# Patient Record
Sex: FEMALE | Race: WHITE | NOT HISPANIC OR LATINO | URBAN - METROPOLITAN AREA
[De-identification: names, ages, dates, MRNs, and addresses within clinical notes are randomized per-mention and may not be internally consistent; named-entity substitution may affect disease eponyms.]

---

## 2020-11-13 ENCOUNTER — APPOINTMENT (RX ONLY)
Dept: URBAN - METROPOLITAN AREA CLINIC 123 | Facility: CLINIC | Age: 20
Setting detail: DERMATOLOGY
End: 2020-11-13

## 2020-11-13 VITALS — TEMPERATURE: 97.5 F

## 2020-11-13 DIAGNOSIS — D22 MELANOCYTIC NEVI: ICD-10-CM

## 2020-11-13 DIAGNOSIS — D18.0 HEMANGIOMA: ICD-10-CM

## 2020-11-13 DIAGNOSIS — L81.4 OTHER MELANIN HYPERPIGMENTATION: ICD-10-CM

## 2020-11-13 DIAGNOSIS — L91.8 OTHER HYPERTROPHIC DISORDERS OF THE SKIN: ICD-10-CM

## 2020-11-13 PROBLEM — D48.5 NEOPLASM OF UNCERTAIN BEHAVIOR OF SKIN: Status: ACTIVE | Noted: 2020-11-13

## 2020-11-13 PROBLEM — D22.9 MELANOCYTIC NEVI, UNSPECIFIED: Status: ACTIVE | Noted: 2020-11-13

## 2020-11-13 PROBLEM — D18.01 HEMANGIOMA OF SKIN AND SUBCUTANEOUS TISSUE: Status: ACTIVE | Noted: 2020-11-13

## 2020-11-13 PROCEDURE — ? ADDITIONAL NOTES

## 2020-11-13 PROCEDURE — ? COUNSELING

## 2020-11-13 PROCEDURE — 11102 TANGNTL BX SKIN SINGLE LES: CPT

## 2020-11-13 PROCEDURE — 11103 TANGNTL BX SKIN EA SEP/ADDL: CPT

## 2020-11-13 PROCEDURE — 99203 OFFICE O/P NEW LOW 30 MIN: CPT | Mod: 25

## 2020-11-13 PROCEDURE — ? BIOPSY BY SHAVE METHOD

## 2020-11-13 ASSESSMENT — LOCATION SIMPLE DESCRIPTION DERM
LOCATION SIMPLE: RIGHT THIGH
LOCATION SIMPLE: LEFT SHOULDER
LOCATION SIMPLE: ABDOMEN

## 2020-11-13 ASSESSMENT — LOCATION ZONE DERM
LOCATION ZONE: ARM
LOCATION ZONE: TRUNK
LOCATION ZONE: LEG

## 2020-11-13 ASSESSMENT — LOCATION DETAILED DESCRIPTION DERM
LOCATION DETAILED: RIGHT ANTERIOR MEDIAL PROXIMAL THIGH
LOCATION DETAILED: SUBXIPHOID
LOCATION DETAILED: LEFT POSTERIOR SHOULDER
LOCATION DETAILED: RIGHT RIB CAGE

## 2023-09-21 PROBLEM — Z00.00 ENCOUNTER FOR PREVENTIVE HEALTH EXAMINATION: Status: ACTIVE | Noted: 2023-09-21

## 2023-09-27 ENCOUNTER — APPOINTMENT (OUTPATIENT)
Dept: ANTEPARTUM | Facility: CLINIC | Age: 23
End: 2023-09-27
Payer: COMMERCIAL

## 2023-09-27 ENCOUNTER — ASOB RESULT (OUTPATIENT)
Age: 23
End: 2023-09-27

## 2023-09-27 PROCEDURE — 76813 OB US NUCHAL MEAS 1 GEST: CPT

## 2023-09-27 PROCEDURE — 76801 OB US < 14 WKS SINGLE FETUS: CPT | Mod: 59

## 2023-11-27 ENCOUNTER — APPOINTMENT (OUTPATIENT)
Dept: ANTEPARTUM | Facility: CLINIC | Age: 23
End: 2023-11-27
Payer: COMMERCIAL

## 2023-11-27 ENCOUNTER — ASOB RESULT (OUTPATIENT)
Age: 23
End: 2023-11-27

## 2023-11-27 PROCEDURE — 76811 OB US DETAILED SNGL FETUS: CPT

## 2024-02-20 ENCOUNTER — OUTPATIENT (OUTPATIENT)
Dept: INPATIENT UNIT | Facility: HOSPITAL | Age: 24
LOS: 1 days | Discharge: HOME | End: 2024-02-20
Payer: COMMERCIAL

## 2024-02-20 ENCOUNTER — APPOINTMENT (OUTPATIENT)
Dept: ANTEPARTUM | Facility: CLINIC | Age: 24
End: 2024-02-20
Payer: COMMERCIAL

## 2024-02-20 ENCOUNTER — ASOB RESULT (OUTPATIENT)
Age: 24
End: 2024-02-20

## 2024-02-20 ENCOUNTER — TRANSCRIPTION ENCOUNTER (OUTPATIENT)
Age: 24
End: 2024-02-20

## 2024-02-20 VITALS
DIASTOLIC BLOOD PRESSURE: 71 MMHG | RESPIRATION RATE: 16 BRPM | SYSTOLIC BLOOD PRESSURE: 120 MMHG | TEMPERATURE: 98 F | HEART RATE: 95 BPM

## 2024-02-20 VITALS — HEART RATE: 89 BPM | SYSTOLIC BLOOD PRESSURE: 103 MMHG | DIASTOLIC BLOOD PRESSURE: 56 MMHG

## 2024-02-20 DIAGNOSIS — Z90.89 ACQUIRED ABSENCE OF OTHER ORGANS: Chronic | ICD-10-CM

## 2024-02-20 DIAGNOSIS — O26.899 OTHER SPECIFIED PREGNANCY RELATED CONDITIONS, UNSPECIFIED TRIMESTER: ICD-10-CM

## 2024-02-20 PROCEDURE — 59025 FETAL NON-STRESS TEST: CPT | Mod: 26

## 2024-02-20 PROCEDURE — 76819 FETAL BIOPHYS PROFIL W/O NST: CPT | Mod: 26

## 2024-02-20 PROCEDURE — 99221 1ST HOSP IP/OBS SF/LOW 40: CPT | Mod: 25

## 2024-02-20 NOTE — OB PROVIDER TRIAGE NOTE - NSHPPHYSICALEXAM_GEN_ALL_CORE
abdomen: soft, nt on palp  NST in progress   T(C): 36.9 (02-20-24 @ 11:32), Max: 36.9 (02-20-24 @ 11:19)  HR: 93 (02-20-24 @ 11:35) (93 - 95)  BP: 112/63 (02-20-24 @ 11:35) (112/63 - 120/71)  RR: 16 (02-20-24 @ 11:19) (16 - 16)  SpO2: -- abdomen: soft, nt on palp  NST in progress   T(C): 36.9 (02-20-24 @ 11:32), Max: 36.9 (02-20-24 @ 11:19)  HR: 93 (02-20-24 @ 11:35) (93 - 95)  BP: 112/63 (02-20-24 @ 11:35) (112/63 - 120/71)  RR: 16 (02-20-24 @ 11:19) (16 - 16)  SpO2: --  addendum @ 1304:  NST reactive  FH baseline 140 FH variability mod +FH accels no FH decels   toco: irregular   sono reports in ASOB  sono images in ASOB   TAS: BPP: 8/8 vtx anterior placenta SUMAYA: 15.93   pt reports +FM  pt visualized +FM

## 2024-02-20 NOTE — OB PROVIDER TRIAGE NOTE - NSOBPROVIDERNOTE_OBGYN_ALL_OB_FT
24 y/o  @ 33 wks gestation presents with decrease FM   maternal and fetal status reassuring   plan of care d/w dr Florian   pt reports +FM   pt visualized +FM   pt to be discharged home   discharge home   PTL precautions d/w pt  increase fluid intake  instructed on fetal kickcounts   follow up with [facility] 3/14/2024  w/v discharge instructions given  discharged home     discharged @ 0544

## 2024-02-20 NOTE — OB PROVIDER TRIAGE NOTE - HISTORY OF PRESENT ILLNESS
24 y/o  @ 33 wks gestation presents with decrease FM x's 1 hour in [facility] and pt now reports +FM denies any uc's vb or lof denies any n/v/d denies any fever or chills ap care uncomplicated thus far

## 2024-02-20 NOTE — OB PROVIDER TRIAGE NOTE - ADDITIONAL INSTRUCTIONS
22 y/o  @ 33 wks gestation presents with decrease FM   maternal and fetal status reassuring   plan of care d/w dr Florian   pt reports +FM   pt visualized +FM   pt to be discharged home   discharge home   PTL precautions d/w pt  increase fluid intake  instructed on fetal kickcounts   follow up with [facility] 3/14/2024  w/v discharge instructions given  discharged home     discharged @ 8430

## 2024-02-22 DIAGNOSIS — O36.8130 DECREASED FETAL MOVEMENTS, THIRD TRIMESTER, NOT APPLICABLE OR UNSPECIFIED: ICD-10-CM

## 2024-02-22 DIAGNOSIS — Z3A.33 33 WEEKS GESTATION OF PREGNANCY: ICD-10-CM

## 2024-04-01 ENCOUNTER — OUTPATIENT (OUTPATIENT)
Dept: INPATIENT UNIT | Facility: HOSPITAL | Age: 24
LOS: 1 days | Discharge: HOME | End: 2024-04-01
Payer: COMMERCIAL

## 2024-04-01 ENCOUNTER — APPOINTMENT (OUTPATIENT)
Dept: ANTEPARTUM | Facility: CLINIC | Age: 24
End: 2024-04-01

## 2024-04-01 ENCOUNTER — TRANSCRIPTION ENCOUNTER (OUTPATIENT)
Age: 24
End: 2024-04-01

## 2024-04-01 ENCOUNTER — INPATIENT (INPATIENT)
Facility: HOSPITAL | Age: 24
LOS: 1 days | Discharge: HOME | End: 2024-04-03
Attending: OBSTETRICS & GYNECOLOGY | Admitting: OBSTETRICS & GYNECOLOGY
Payer: COMMERCIAL

## 2024-04-01 VITALS
SYSTOLIC BLOOD PRESSURE: 125 MMHG | RESPIRATION RATE: 16 BRPM | DIASTOLIC BLOOD PRESSURE: 65 MMHG | TEMPERATURE: 98 F | HEART RATE: 104 BPM

## 2024-04-01 VITALS — TEMPERATURE: 98 F

## 2024-04-01 VITALS — SYSTOLIC BLOOD PRESSURE: 114 MMHG | DIASTOLIC BLOOD PRESSURE: 68 MMHG | HEART RATE: 97 BPM

## 2024-04-01 DIAGNOSIS — Z90.89 ACQUIRED ABSENCE OF OTHER ORGANS: Chronic | ICD-10-CM

## 2024-04-01 DIAGNOSIS — O26.899 OTHER SPECIFIED PREGNANCY RELATED CONDITIONS, UNSPECIFIED TRIMESTER: ICD-10-CM

## 2024-04-01 LAB
BASOPHILS # BLD AUTO: 0.05 K/UL — SIGNIFICANT CHANGE UP (ref 0–0.2)
BASOPHILS NFR BLD AUTO: 0.4 % — SIGNIFICANT CHANGE UP (ref 0–2)
BLD GP AB SCN SERPL QL: NEGATIVE — SIGNIFICANT CHANGE UP
EOSINOPHIL # BLD AUTO: 0.03 K/UL — SIGNIFICANT CHANGE UP (ref 0–0.5)
EOSINOPHIL NFR BLD AUTO: 0.2 % — SIGNIFICANT CHANGE UP (ref 0–6)
HCT VFR BLD CALC: 38.1 % — SIGNIFICANT CHANGE UP (ref 34.5–45)
HGB BLD-MCNC: 12.8 G/DL — SIGNIFICANT CHANGE UP (ref 11.5–15.5)
IANC: 10.96 K/UL — HIGH (ref 1.8–7.4)
IMM GRANULOCYTES NFR BLD AUTO: 1.4 % — HIGH (ref 0–0.9)
LYMPHOCYTES # BLD AUTO: 1.83 K/UL — SIGNIFICANT CHANGE UP (ref 1–3.3)
LYMPHOCYTES # BLD AUTO: 13.2 % — SIGNIFICANT CHANGE UP (ref 13–44)
MCHC RBC-ENTMCNC: 29.2 PG — SIGNIFICANT CHANGE UP (ref 27–34)
MCHC RBC-ENTMCNC: 33.6 GM/DL — SIGNIFICANT CHANGE UP (ref 32–36)
MCV RBC AUTO: 86.8 FL — SIGNIFICANT CHANGE UP (ref 80–100)
MONOCYTES # BLD AUTO: 0.76 K/UL — SIGNIFICANT CHANGE UP (ref 0–0.9)
MONOCYTES NFR BLD AUTO: 5.5 % — SIGNIFICANT CHANGE UP (ref 2–14)
NEUTROPHILS # BLD AUTO: 10.96 K/UL — HIGH (ref 1.8–7.4)
NEUTROPHILS NFR BLD AUTO: 79.3 % — HIGH (ref 43–77)
NRBC # BLD: 0 /100 WBCS — SIGNIFICANT CHANGE UP (ref 0–0)
NRBC # FLD: 0 K/UL — SIGNIFICANT CHANGE UP (ref 0–0)
PLATELET # BLD AUTO: 212 K/UL — SIGNIFICANT CHANGE UP (ref 150–400)
RBC # BLD: 4.39 M/UL — SIGNIFICANT CHANGE UP (ref 3.8–5.2)
RBC # FLD: 13.2 % — SIGNIFICANT CHANGE UP (ref 10.3–14.5)
RH IG SCN BLD-IMP: POSITIVE — SIGNIFICANT CHANGE UP
RH IG SCN BLD-IMP: POSITIVE — SIGNIFICANT CHANGE UP
WBC # BLD: 13.83 K/UL — HIGH (ref 3.8–10.5)
WBC # FLD AUTO: 13.83 K/UL — HIGH (ref 3.8–10.5)

## 2024-04-01 PROCEDURE — 99212 OFFICE O/P EST SF 10 MIN: CPT

## 2024-04-01 RX ORDER — PRAMOXINE HYDROCHLORIDE 150 MG/15G
1 AEROSOL, FOAM RECTAL EVERY 4 HOURS
Refills: 0 | Status: DISCONTINUED | OUTPATIENT
Start: 2024-04-01 | End: 2024-04-03

## 2024-04-01 RX ORDER — SODIUM CHLORIDE 9 MG/ML
1000 INJECTION, SOLUTION INTRAVENOUS
Refills: 0 | Status: DISCONTINUED | OUTPATIENT
Start: 2024-04-01 | End: 2024-04-02

## 2024-04-01 RX ORDER — DIBUCAINE 1 %
1 OINTMENT (GRAM) RECTAL EVERY 6 HOURS
Refills: 0 | Status: DISCONTINUED | OUTPATIENT
Start: 2024-04-01 | End: 2024-04-03

## 2024-04-01 RX ORDER — SODIUM CHLORIDE 9 MG/ML
3 INJECTION INTRAMUSCULAR; INTRAVENOUS; SUBCUTANEOUS EVERY 8 HOURS
Refills: 0 | Status: DISCONTINUED | OUTPATIENT
Start: 2024-04-01 | End: 2024-04-03

## 2024-04-01 RX ORDER — IBUPROFEN 200 MG
600 TABLET ORAL EVERY 6 HOURS
Refills: 0 | Status: COMPLETED | OUTPATIENT
Start: 2024-04-01 | End: 2025-02-28

## 2024-04-01 RX ORDER — AER TRAVELER 0.5 G/1
1 SOLUTION RECTAL; TOPICAL EVERY 4 HOURS
Refills: 0 | Status: DISCONTINUED | OUTPATIENT
Start: 2024-04-01 | End: 2024-04-03

## 2024-04-01 RX ORDER — CITRIC ACID/SODIUM CITRATE 300-500 MG
15 SOLUTION, ORAL ORAL EVERY 6 HOURS
Refills: 0 | Status: DISCONTINUED | OUTPATIENT
Start: 2024-04-01 | End: 2024-04-02

## 2024-04-01 RX ORDER — OXYTOCIN 10 UNIT/ML
333.33 VIAL (ML) INJECTION
Qty: 20 | Refills: 0 | Status: DISCONTINUED | OUTPATIENT
Start: 2024-04-01 | End: 2024-04-02

## 2024-04-01 RX ORDER — BENZOCAINE 10 %
1 GEL (GRAM) MUCOUS MEMBRANE EVERY 6 HOURS
Refills: 0 | Status: DISCONTINUED | OUTPATIENT
Start: 2024-04-01 | End: 2024-04-03

## 2024-04-01 RX ORDER — HYDROCORTISONE 1 %
1 OINTMENT (GRAM) TOPICAL EVERY 6 HOURS
Refills: 0 | Status: DISCONTINUED | OUTPATIENT
Start: 2024-04-01 | End: 2024-04-03

## 2024-04-01 RX ORDER — CHLORHEXIDINE GLUCONATE 213 G/1000ML
1 SOLUTION TOPICAL DAILY
Refills: 0 | Status: DISCONTINUED | OUTPATIENT
Start: 2024-04-01 | End: 2024-04-02

## 2024-04-01 RX ORDER — CHOLECALCIFEROL (VITAMIN D3) 125 MCG
1 CAPSULE ORAL
Refills: 0 | DISCHARGE

## 2024-04-01 RX ORDER — ACETAMINOPHEN 500 MG
975 TABLET ORAL
Refills: 0 | Status: DISCONTINUED | OUTPATIENT
Start: 2024-04-01 | End: 2024-04-03

## 2024-04-01 RX ORDER — MAGNESIUM HYDROXIDE 400 MG/1
30 TABLET, CHEWABLE ORAL
Refills: 0 | Status: DISCONTINUED | OUTPATIENT
Start: 2024-04-01 | End: 2024-04-03

## 2024-04-01 RX ORDER — SIMETHICONE 80 MG/1
80 TABLET, CHEWABLE ORAL EVERY 4 HOURS
Refills: 0 | Status: DISCONTINUED | OUTPATIENT
Start: 2024-04-01 | End: 2024-04-03

## 2024-04-01 RX ORDER — OXYCODONE HYDROCHLORIDE 5 MG/1
5 TABLET ORAL
Refills: 0 | Status: DISCONTINUED | OUTPATIENT
Start: 2024-04-01 | End: 2024-04-03

## 2024-04-01 RX ORDER — DIPHENHYDRAMINE HCL 50 MG
25 CAPSULE ORAL EVERY 6 HOURS
Refills: 0 | Status: DISCONTINUED | OUTPATIENT
Start: 2024-04-01 | End: 2024-04-03

## 2024-04-01 RX ORDER — TETANUS TOXOID, REDUCED DIPHTHERIA TOXOID AND ACELLULAR PERTUSSIS VACCINE, ADSORBED 5; 2.5; 8; 8; 2.5 [IU]/.5ML; [IU]/.5ML; UG/.5ML; UG/.5ML; UG/.5ML
0.5 SUSPENSION INTRAMUSCULAR ONCE
Refills: 0 | Status: DISCONTINUED | OUTPATIENT
Start: 2024-04-01 | End: 2024-04-03

## 2024-04-01 RX ORDER — KETOROLAC TROMETHAMINE 30 MG/ML
30 SYRINGE (ML) INJECTION ONCE
Refills: 0 | Status: DISCONTINUED | OUTPATIENT
Start: 2024-04-01 | End: 2024-04-02

## 2024-04-01 RX ORDER — OXYTOCIN 10 UNIT/ML
41.67 VIAL (ML) INJECTION
Qty: 20 | Refills: 0 | Status: DISCONTINUED | OUTPATIENT
Start: 2024-04-01 | End: 2024-04-02

## 2024-04-01 RX ORDER — SODIUM CHLORIDE 9 MG/ML
1000 INJECTION, SOLUTION INTRAVENOUS ONCE
Refills: 0 | Status: COMPLETED | OUTPATIENT
Start: 2024-04-01 | End: 2024-04-01

## 2024-04-01 RX ORDER — LANOLIN
1 OINTMENT (GRAM) TOPICAL EVERY 6 HOURS
Refills: 0 | Status: DISCONTINUED | OUTPATIENT
Start: 2024-04-01 | End: 2024-04-03

## 2024-04-01 RX ORDER — OXYCODONE HYDROCHLORIDE 5 MG/1
5 TABLET ORAL ONCE
Refills: 0 | Status: DISCONTINUED | OUTPATIENT
Start: 2024-04-01 | End: 2024-04-03

## 2024-04-01 RX ADMIN — CHLORHEXIDINE GLUCONATE 1 APPLICATION(S): 213 SOLUTION TOPICAL at 18:45

## 2024-04-01 RX ADMIN — SODIUM CHLORIDE 125 MILLILITER(S): 9 INJECTION, SOLUTION INTRAVENOUS at 18:25

## 2024-04-01 RX ADMIN — SODIUM CHLORIDE 1000 MILLILITER(S): 9 INJECTION, SOLUTION INTRAVENOUS at 06:34

## 2024-04-01 NOTE — DISCHARGE NOTE OB - ADDITIONAL INSTRUCTIONS
Call the office to schedule a post-partum appointment in 6 weeks. Follow up @ [facility] office in 7days for PP BP Check  Monitor BP @ home 3 times daily (morning/noon/night)  keep a strict blood pressure log and bring to the office 5-7 days after hospital discharge for review  if you have BP > 160/100 call the office for further advise  if you have headaches, vision changes, upper abdominal pain call the office to notify and go to [facility] Triage for evaluation

## 2024-04-01 NOTE — OB PROVIDER TRIAGE NOTE - NSOBPROVIDERNOTE_OBGYN_ALL_OB_FT
Ms. EVANGELIST OBREGON is a 23y  @ 38w6d here in latent labor and category 2 tracing.   ** Provider called into quicklook, RN noted patient w decel on NST in 90s, HR sounding fetal, maternal HR 110s on pulse ox, Dr. Barnes notified, immediately brought to DT1 with baseline 125, mod variability, + accels.     Plan  - Admit to Labor and Delivery for induction of labor for category 2 tracing.   - Continuous EFM  - NPO   - Consent signed  - Standard labs (T&S, CBC, RPR)  - Epidural PRN, cleared by    - Induction/augmentation agent:   - Consider re-examination in 4 hours     Informed consent was obtained. The following was discussed:  - Induction/augmentation of labor: use of medication and/or cook balloon to begin or enhance labor  - Obstetrical management including internal fetal/contraction monitoring  - Normal vaginal delivery  - Possible  section    Risks, benefits, alternatives, and possible complications have been discussed in detail with the patient in English, patient's preferred language, demonstrating understanding, all questions answered.. Pre-admission, admission, and post admission procedures and expectations were discussed in detail. All questions answered, all appropriate hospital consents were signed. Anticipate normal vaginal delivery.    Evaluation and plan d/w  Ms. EVANGELIST OBREGON is a 23y  @ 38w6d here in latent labor and category 2 tracing.   ** Provider called into quicklook, RN noted patient w decel on NST in 90s, HR sounding fetal, maternal HR 110s on pulse ox, Dr. Barnes notified, immediately brought to DT1 with baseline 125, mod variability, + accels.     Plan  - Admit to Labor and Delivery for induction of labor for category 2 tracing.   - Continuous EFM  - NPO   - Consent signed  - Standard labs (T&S, CBC, RPR)  - Epidural PRN, cleared by Dr. Lira  - Induction/augmentation agent: ?Pitocin, pending more tracing, d/w Dr. Lira   - Consider re-examination in 4 hours     Informed consent was obtained. The following was discussed:  - Induction/augmentation of labor: use of medication and/or cook balloon to begin or enhance labor  - Obstetrical management including internal fetal/contraction monitoring  - Normal vaginal delivery  - Possible  section    Risks, benefits, alternatives, and possible complications have been discussed in detail with the patient in English, patient's preferred language, demonstrating understanding, all questions answered.. Pre-admission, admission, and post admission procedures and expectations were discussed in detail. All questions answered, all appropriate hospital consents were signed. Anticipate normal vaginal delivery.    Evaluation and plan d/w Dr. Lira

## 2024-04-01 NOTE — OB PROVIDER TRIAGE NOTE - HISTORY OF PRESENT ILLNESS
22 y/o , EDC /, GA 38.6 weeks, presents for contractions since 00:15, every 5 minutes, 5/10 in intensity on numeric pain scale. Denies VB/ LOF. Reports good FM.    PNC: [facility]    AP Course: Uncomplicated per pt

## 2024-04-01 NOTE — OB PROVIDER LABOR PROGRESS NOTE - NS_SUBJECTIVE/OBJECTIVE_OBGYN_ALL_OB_FT
Patient seen and examined to assess for cervical change. Effective epidural in situ. Patient comfortable without complaints.  VS  T(C): 36.9 (04-01-24 @ 18:33)  HR: 105 (04-01-24 @ 20:47)  BP: 141/63 (04-01-24 @ 20:41)  RR: 17 (04-01-24 @ 18:33)  SpO2: 100% (04-01-24 @ 20:47)

## 2024-04-01 NOTE — OB RN PATIENT PROFILE - NS TRANSFER PATIENT BELONGINGS
Cell Phone/PDA (specify)/Clothing engagement ring and wedding ring given to patient's spouse in specimen bag./Cell Phone/PDA (specify)/Jewelry/Money (specify)/Clothing

## 2024-04-01 NOTE — OB PROVIDER H&P - ASSESSMENT
Ms. EVANGELIST OBREGON is a 23y  @ 38w6d here in latent labor and category 2 tracing. Last PO   ** Provider called into quicklook, RN noted patient w decel on NST in 90s, HR sounding fetal, maternal HR 110s on pulse ox, Dr. Barnes notified, immediately brought to DT1 with baseline 125, mod variability, + accels.     Plan  - Admit to Labor and Delivery for induction of labor for category 2 tracing.   - Continuous EFM  - NPO   - Consent signed  - Standard labs (T&S, CBC, RPR)  - Epidural PRN, cleared by Dr. Lira  - Induction/augmentation agent: ?Pitocin, pending more tracing, d/w Dr. Lira   - Consider re-examination in 4 hours     Informed consent was obtained. The following was discussed:  - Induction/augmentation of labor: use of medication and/or cook balloon to begin or enhance labor  - Obstetrical management including internal fetal/contraction monitoring  - Normal vaginal delivery  - Possible  section    Risks, benefits, alternatives, and possible complications have been discussed in detail with the patient in English, patient's preferred language, demonstrating understanding, all questions answered.. Pre-admission, admission, and post admission procedures and expectations were discussed in detail. All questions answered, all appropriate hospital consents were signed. Anticipate normal vaginal delivery.    Evaluation and plan d/w Dr. Lira

## 2024-04-01 NOTE — OB PROVIDER TRIAGE NOTE - ADDITIONAL INSTRUCTIONS
- Please follow up with [facility] at your next scheduled appointment tomorrow morning.   - Please return for decreased/no fetal movement, vaginal bleeding similar to that of a period, leaking/gush of fluid, regular contractions occurring 4-5 minutes for one hour or requiring pain medication

## 2024-04-01 NOTE — OB PROVIDER TRIAGE NOTE - NSHPPHYSICALEXAM_GEN_ALL_CORE
Vital Signs Last 24 Hrs  T(C): 36.7 (01 Apr 2024 05:31), Max: 36.7 (01 Apr 2024 05:26)  T(F): 98.06 (01 Apr 2024 05:31), Max: 98.1 (01 Apr 2024 05:26)  HR: 97 (01 Apr 2024 05:33) (97 - 104)  BP: 114/68 (01 Apr 2024 05:33) (114/68 - 125/65)  BP(mean): --  RR: 16 (01 Apr 2024 05:31) (16 - 16)  SpO2: --    Gen: A/O x3  Abd: Gravid uterus, toco in place   TAS: vertex, anterior placenta,  bpm in m-mode, SUMAYA 20.34, BPP 8/8, images saved in ASOB  FHR: in progress  CTX: regular, q1-3 min   SVE: 2.5/50/-3  EFW: 3400

## 2024-04-01 NOTE — OB PROVIDER TRIAGE NOTE - NSOBPROVIDERNOTE_OBGYN_ALL_OB_FT
22 y/o , EDC , GA 38.6 weeks, evidence of latent labor.  -BPP 8/8  -frequent contractions on toco, tracing initially minimal variability  -1L LR bolus  -repeat VE @ 8 AM  -report given to day shift    -d/w Dr. Santa Scott, PAC 22 y/o , EDC , GA 38.6 weeks, evidence of latent labor.  -BPP 8/8  -frequent contractions on toco, tracing initially minimal variability  -1L LR bolus  -repeat VE @ 8 AM  -report given to day shift    -d/w Dr. Santa Scott, PAC    Received sign out from night team.    22 y/o , EDC , GA 38.6 weeks, evidence of latent labor.    - Pt notes spacing out of ctx to q5-10min w slightly worsening of intensity 6/10, continues to decline epidural   - SVE unchanged 2-3 / 50 / -3   - Pt not feeling all of ctx noted on toco. Provider in room as pt w ctx on toco, patient denies feeling and appears comfortable   - Pt counseled, would like to continue to labor @ home @ this time   - End of tracing, pt lying down for VE     Plan  - Patient to be discharged home with follow up and return precautions  - VSS, patient appears comfortable, ambulating without issues, stable for discharge.   - Please follow up with [facility] at your next scheduled appointment tomorrow morning.   - Please return for decreased/no fetal movement, vaginal bleeding similar to that of a period, leaking/gush of fluid, regular contractions occurring 4-5 minutes for one hour or requiring pain medication   - Patient and partner educated of results plan and demonstrate understanding. All questions answered. Discharge instructions provided and signed.   - Discharged at:     Evaluation and plan d/w 22 y/o , EDC , GA 38.6 weeks, evidence of latent labor.  -BPP 8/8  -frequent contractions on toco, tracing initially minimal variability  -1L LR bolus  -repeat VE @ 8 AM  -report given to day shift    -d/w Dr. Santa Scott, PAC    Received sign out from night team.    22 y/o , EDC 4/, GA 38.6 weeks, evidence of latent labor.    - Pt notes spacing out of ctx to q5-10min w slightly worsening of intensity 6/10, continues to decline epidural   - SVE unchanged 2-3 / 50 / -3   - Pt not feeling all of ctx noted on toco. Provider in room as pt w ctx on toco, patient denies feeling and appears comfortable   - Pt counseled, would like to continue to labor @ home @ this time   - End of tracing, pt lying down for VE     Plan  - Patient to be discharged home with follow up and return precautions  - VSS, patient appears comfortable, ambulating without issues, stable for discharge.   - Please follow up with [facility] at your next scheduled appointment tomorrow morning.   - Please return for decreased/no fetal movement, vaginal bleeding similar to that of a period, leaking/gush of fluid, regular contractions occurring 4-5 minutes for one hour or requiring pain medication   - Patient and partner educated of results plan and demonstrate understanding. All questions answered. Discharge instructions provided and signed.   - Discharged at: 08:50    Evaluation and plan d/w Dr. Lira

## 2024-04-01 NOTE — OB PROVIDER H&P - NSHPPHYSICALEXAM_GEN_ALL_CORE
T(C): 36.9 (04-01-24 @ 17:15), Max: 36.9 (04-01-24 @ 17:14)  HR: 94 (04-01-24 @ 17:36) (94 - 118)  BP: 119/75 (04-01-24 @ 17:36) (114/68 - 137/71)  RR: 18 (04-01-24 @ 17:15) (16 - 18)  SpO2: 98% (04-01-24 @ 17:35) (97% - 100%)  General: Female visibly uncomfortable w ctx. Breathing through ctx.   Head: Normocephalic. Atraumatic.   Eyes: No discharge, lids normal, conjunctiva normal  Lungs: No resp distress  Abdomen: Soft, nontender. Gravid.   TAUS:  Sono saved in ASOB.   NST: Reactive. Category 1 currently. Was in EMTALA with prolonged deceleration.   SVE: White physiologic discharge present.   Neuro: No facial asymmetry, no slurred speech, moves all 4 extremities  Mood: Alert and lucid, appropriate mood and affect

## 2024-04-01 NOTE — OB NEONATOLOGY/PEDIATRICIAN DELIVERY SUMMARY - NSPEDSNEONOTESA_OBGYN_ALL_OB_FT
Peds called to LDR for cat II tracings for a 38.6 wk AGA female born via  to a 22 y/o  mother.  No significant maternal or prenatal history. Maternal labs include Blood Type AB+, HIV - , RPR NR , Rubella I , Hep B - , GBS - 3/21. SROM at  on  with clear fluids (ROM hours: 3H).  Baby emerged vigorous, crying, was w/d/s/s with APGARS of 9/9 . Mom plans to initiate breastfeeding , consents Hep B vaccine.  Highest maternal temp: 37.0. EOS 0.09.    : 23  TOB: 2336  Weight: 2910

## 2024-04-01 NOTE — OB RN DELIVERY SUMMARY - BABY A: APGAR 1 MIN REFLEX IRRITABILITY, DELIVERY
From: Dunia Acosta  To: Saul Amaya  Sent: 3/22/2021 2:20 PM CDT  Subject: Non-Urgent Medical Question    Hi,  I'm going to make an appointment with Dr. Amaya this pain to my right buttocks doesn't seem to budge. I'm not sure if I'll be able to get in within 2 weeks, could I get my prescription refilled if possible? I'm going to call to schedule now.  Thanks   (2) cough or sneeze

## 2024-04-01 NOTE — DISCHARGE NOTE OB - PATIENT PORTAL LINK FT
You can access the FollowMyHealth Patient Portal offered by [facility] by registering at the following website: [email or web address]. By joining [employer]’s FollowMyHealth portal, you will also be able to view your health information using other applications (apps) compatible with our system.

## 2024-04-01 NOTE — DISCHARGE NOTE OB - CARE PROVIDERS DIRECT ADDRESSES
[email or web address]

## 2024-04-01 NOTE — OB PROVIDER TRIAGE NOTE - NSHPPHYSICALEXAM_GEN_ALL_CORE
T(C): 36.9 (04-01-24 @ 17:15), Max: 36.9 (04-01-24 @ 17:14)  HR: 94 (04-01-24 @ 17:36) (94 - 118)  BP: 119/75 (04-01-24 @ 17:36) (114/68 - 137/71)  RR: 18 (04-01-24 @ 17:15) (16 - 18)  SpO2: 98% (04-01-24 @ 17:35) (97% - 100%)  General: Female visibly uncomfortable w ctx. Breathing through ctx.   Head: Normocephalic. Atraumatic.   Eyes: No discharge, lids normal, conjunctiva normal  Lungs: No resp distress  Abdomen: Soft, nontender. Gravid.   TAUS:  Sono saved in ASOB.   NST:   SSE: No erythema, edema, lesions to external genitalia. Physiologic discharge present. No bleeding.  Negative pooling.   Neuro: No facial asymmetry, no slurred speech, moves all 4 extremities  Mood: Alert and lucid, appropriate mood and affect T(C): 36.9 (04-01-24 @ 17:15), Max: 36.9 (04-01-24 @ 17:14)  HR: 94 (04-01-24 @ 17:36) (94 - 118)  BP: 119/75 (04-01-24 @ 17:36) (114/68 - 137/71)  RR: 18 (04-01-24 @ 17:15) (16 - 18)  SpO2: 98% (04-01-24 @ 17:35) (97% - 100%)  General: Female visibly uncomfortable w ctx. Breathing through ctx.   Head: Normocephalic. Atraumatic.   Eyes: No discharge, lids normal, conjunctiva normal  Lungs: No resp distress  Abdomen: Soft, nontender. Gravid.   TAUS:  Sono saved in ASOB.   NST: Reactive. Category 1 currently. Was in EMTALA with prolonged deceleration.   SVE: White physiologic discharge present.   Neuro: No facial asymmetry, no slurred speech, moves all 4 extremities  Mood: Alert and lucid, appropriate mood and affect

## 2024-04-01 NOTE — OB RN DELIVERY SUMMARY - NS_SEPSISRSKCALC_OBGYN_ALL_OB_FT
EOS calculated successfully. EOS Risk Factor: 0.5/1000 live births ([facility] national incidence); GA=38w6d; Temp=98.78; ROM=2.85; GBS='Negative'; Antibiotics='No antibiotics or any antibiotics < 2 hrs prior to birth'

## 2024-04-01 NOTE — OB PROVIDER TRIAGE NOTE - HISTORY OF PRESENT ILLNESS
Ms. EVANGELIST OBREGON is a 23y  @ 38w6d p/w ctx q5min incr in pain. Seen last night for labor, discharged @ 2-3 / 50 / -3. + FM. + Brown spotting. Denies lof.   ** Provider called into quicklook, RN noted patient w decel on NST in 90s, HR sounding fetal, maternal HR 110s on pulse ox, immediately brought to DT1 with baseline 125, mod variability, + accels.   PNC: [facility]. Uncx. Pt denies complications with blood pressure and/or blood sugar.

## 2024-04-01 NOTE — OB PROVIDER H&P - NSLOWPPHRISK_OBGYN_A_OB
No previous uterine incision/Scott Pregnancy/Less than or equal to 4 previous vaginal births/No known bleeding disorder/No history of postpartum hemorrhage/No other PPH risks indicated

## 2024-04-01 NOTE — OB RN PATIENT PROFILE - FALL HARM RISK - UNIVERSAL INTERVENTIONS
Bed in lowest position, wheels locked, appropriate side rails in place/Call bell, personal items and telephone in reach/Instruct patient to call for assistance before getting out of bed or chair/Non-slip footwear when patient is out of bed/[place] to call system/Physically safe environment - no spills, clutter or unnecessary equipment/Purposeful Proactive Rounding/Room/bathroom lighting operational, light cord in reach

## 2024-04-01 NOTE — OB PROVIDER LABOR PROGRESS NOTE - ASSESSMENT
@38.6wks Labor  SROM on exam- clear fluid  Cervical change noted  Cont with expectant management   Cont EFM/TOCO  Anticipate   Will reassess in 2 hrs or PRN    karol Zayas NP

## 2024-04-01 NOTE — DISCHARGE NOTE OB - CARE PROVIDER_API CALL
Isabela Pratt  Obstetrics and Gynecology  [address] [place] [address], [address]  [place] [address]  Phone: [phone]  Fax: [phone]  Follow Up Time:

## 2024-04-02 LAB
ALBUMIN SERPL ELPH-MCNC: 3.2 G/DL — LOW (ref 3.3–5)
ALP SERPL-CCNC: 110 U/L — SIGNIFICANT CHANGE UP (ref 40–120)
ALT FLD-CCNC: 18 U/L — SIGNIFICANT CHANGE UP (ref 4–33)
ANION GAP SERPL CALC-SCNC: 10 MMOL/L — SIGNIFICANT CHANGE UP (ref 7–14)
APTT BLD: 26.1 SEC — SIGNIFICANT CHANGE UP (ref 24.5–35.6)
AST SERPL-CCNC: 28 U/L — SIGNIFICANT CHANGE UP (ref 4–32)
BASOPHILS # BLD AUTO: 0.04 K/UL — SIGNIFICANT CHANGE UP (ref 0–0.2)
BASOPHILS NFR BLD AUTO: 0.3 % — SIGNIFICANT CHANGE UP (ref 0–2)
BILIRUB SERPL-MCNC: 0.3 MG/DL — SIGNIFICANT CHANGE UP (ref 0.2–1.2)
BUN SERPL-MCNC: 6 MG/DL — LOW (ref 7–23)
CALCIUM SERPL-MCNC: 9 MG/DL — SIGNIFICANT CHANGE UP (ref 8.4–10.5)
CHLORIDE SERPL-SCNC: 103 MMOL/L — SIGNIFICANT CHANGE UP (ref 98–107)
CO2 SERPL-SCNC: 23 MMOL/L — SIGNIFICANT CHANGE UP (ref 22–31)
CREAT SERPL-MCNC: 0.51 MG/DL — SIGNIFICANT CHANGE UP (ref 0.5–1.3)
EGFR: 134 ML/MIN/1.73M2 — SIGNIFICANT CHANGE UP
EOSINOPHIL # BLD AUTO: 0.05 K/UL — SIGNIFICANT CHANGE UP (ref 0–0.5)
EOSINOPHIL NFR BLD AUTO: 0.4 % — SIGNIFICANT CHANGE UP (ref 0–6)
FIBRINOGEN PPP-MCNC: 524 MG/DL — HIGH (ref 200–465)
GLUCOSE SERPL-MCNC: 80 MG/DL — SIGNIFICANT CHANGE UP (ref 70–99)
HCT VFR BLD CALC: 34.7 % — SIGNIFICANT CHANGE UP (ref 34.5–45)
HGB BLD-MCNC: 11.6 G/DL — SIGNIFICANT CHANGE UP (ref 11.5–15.5)
IANC: 10.83 K/UL — HIGH (ref 1.8–7.4)
IMM GRANULOCYTES NFR BLD AUTO: 1.4 % — HIGH (ref 0–0.9)
INR BLD: 0.92 RATIO — SIGNIFICANT CHANGE UP (ref 0.85–1.18)
LDH SERPL L TO P-CCNC: 221 U/L — SIGNIFICANT CHANGE UP (ref 135–225)
LYMPHOCYTES # BLD AUTO: 1.72 K/UL — SIGNIFICANT CHANGE UP (ref 1–3.3)
LYMPHOCYTES # BLD AUTO: 12.7 % — LOW (ref 13–44)
MCHC RBC-ENTMCNC: 28.9 PG — SIGNIFICANT CHANGE UP (ref 27–34)
MCHC RBC-ENTMCNC: 33.4 GM/DL — SIGNIFICANT CHANGE UP (ref 32–36)
MCV RBC AUTO: 86.5 FL — SIGNIFICANT CHANGE UP (ref 80–100)
MONOCYTES # BLD AUTO: 0.76 K/UL — SIGNIFICANT CHANGE UP (ref 0–0.9)
MONOCYTES NFR BLD AUTO: 5.6 % — SIGNIFICANT CHANGE UP (ref 2–14)
NEUTROPHILS # BLD AUTO: 10.83 K/UL — HIGH (ref 1.8–7.4)
NEUTROPHILS NFR BLD AUTO: 79.6 % — HIGH (ref 43–77)
NRBC # BLD: 0 /100 WBCS — SIGNIFICANT CHANGE UP (ref 0–0)
NRBC # FLD: 0 K/UL — SIGNIFICANT CHANGE UP (ref 0–0)
PLATELET # BLD AUTO: 170 K/UL — SIGNIFICANT CHANGE UP (ref 150–400)
POTASSIUM SERPL-MCNC: 3.8 MMOL/L — SIGNIFICANT CHANGE UP (ref 3.5–5.3)
POTASSIUM SERPL-SCNC: 3.8 MMOL/L — SIGNIFICANT CHANGE UP (ref 3.5–5.3)
PROT SERPL-MCNC: 5.8 G/DL — LOW (ref 6–8.3)
PROTHROM AB SERPL-ACNC: 10.4 SEC — SIGNIFICANT CHANGE UP (ref 9.5–13)
RBC # BLD: 4.01 M/UL — SIGNIFICANT CHANGE UP (ref 3.8–5.2)
RBC # FLD: 13.2 % — SIGNIFICANT CHANGE UP (ref 10.3–14.5)
SODIUM SERPL-SCNC: 136 MMOL/L — SIGNIFICANT CHANGE UP (ref 135–145)
T PALLIDUM AB TITR SER: NEGATIVE — SIGNIFICANT CHANGE UP
URATE SERPL-MCNC: 4.5 MG/DL — SIGNIFICANT CHANGE UP (ref 2.5–7)
WBC # BLD: 13.59 K/UL — HIGH (ref 3.8–10.5)
WBC # FLD AUTO: 13.59 K/UL — HIGH (ref 3.8–10.5)

## 2024-04-02 RX ORDER — IBUPROFEN 200 MG
600 TABLET ORAL EVERY 6 HOURS
Refills: 0 | Status: DISCONTINUED | OUTPATIENT
Start: 2024-04-02 | End: 2024-04-03

## 2024-04-02 RX ADMIN — Medication 600 MILLIGRAM(S): at 06:09

## 2024-04-02 RX ADMIN — Medication 600 MILLIGRAM(S): at 06:39

## 2024-04-02 RX ADMIN — Medication 600 MILLIGRAM(S): at 12:05

## 2024-04-02 RX ADMIN — Medication 975 MILLIGRAM(S): at 08:43

## 2024-04-02 RX ADMIN — Medication 975 MILLIGRAM(S): at 15:53

## 2024-04-02 RX ADMIN — Medication 975 MILLIGRAM(S): at 02:56

## 2024-04-02 RX ADMIN — SODIUM CHLORIDE 3 MILLILITER(S): 9 INJECTION INTRAMUSCULAR; INTRAVENOUS; SUBCUTANEOUS at 13:31

## 2024-04-02 RX ADMIN — Medication 975 MILLIGRAM(S): at 09:20

## 2024-04-02 RX ADMIN — Medication 975 MILLIGRAM(S): at 21:37

## 2024-04-02 RX ADMIN — Medication 600 MILLIGRAM(S): at 17:29

## 2024-04-02 RX ADMIN — Medication 600 MILLIGRAM(S): at 11:28

## 2024-04-02 RX ADMIN — Medication 600 MILLIGRAM(S): at 18:03

## 2024-04-02 RX ADMIN — Medication 15 MILLILITER(S): at 02:17

## 2024-04-02 RX ADMIN — Medication 1 TABLET(S): at 11:28

## 2024-04-02 RX ADMIN — Medication 975 MILLIGRAM(S): at 16:35

## 2024-04-02 RX ADMIN — Medication 600 MILLIGRAM(S): at 23:49

## 2024-04-02 RX ADMIN — Medication 975 MILLIGRAM(S): at 03:30

## 2024-04-02 RX ADMIN — SODIUM CHLORIDE 3 MILLILITER(S): 9 INJECTION INTRAMUSCULAR; INTRAVENOUS; SUBCUTANEOUS at 06:13

## 2024-04-02 RX ADMIN — Medication 975 MILLIGRAM(S): at 21:07

## 2024-04-02 RX ADMIN — SODIUM CHLORIDE 3 MILLILITER(S): 9 INJECTION INTRAMUSCULAR; INTRAVENOUS; SUBCUTANEOUS at 21:42

## 2024-04-02 NOTE — OB PROVIDER DELIVERY SUMMARY - NSPROVIDERDELIVERYNOTE_OBGYN_ALL_OB_FT
of liveborn female  in BASHIR position over intact perineum to sterile field. Right anterior shoulder delivered without difficulty.  delivered through loose nuchal cord x 1. Spontaneous cry, delayed cord clamping performed.  handed to awaiting peds. Apgars 9/9, weight 6#6oz. Placenta , intact. 2nd deg and b/l labial lacerations repaired with 2-0 chromic. Pressure applied to friable vaginal mucosa with good hemostasis. Firm fundus and good hemostasis at end.     QBL 237ml

## 2024-04-02 NOTE — PROGRESS NOTE ADULT - ASSESSMENT
Assessment and Plan  PPD #1 s/p , QBL 237ml. GHTN.     GHTN#  -BP x 24 hours: BP:  (102/63 - 156/93)  -HELLP WNL   -denies s/s of PEC  -continue to monitor    Doing well postpartum  Increase ambulation.  Encourage breastfeeding.  Continue taking PO pain meds PRN    PP & PPD Instructions reviewed.  PP educational materials reviewed & provided  PEC s/s reviewed with patient     Follow up @ [facility] office in 7days for PP BP Check  Monitor BP @ home 3 times daily (morning/noon/night)  keep a strict blood pressure log and bring to the office 5-7 days after hospital discharge for review  if you have BP > 160/100 call the office for further advise  if you have headaches, vision changes, upper abdominal pain call the office to notify and go to [facility] Triage for evaluation    Discharge planning      Discussed with MD Mynor Lam

## 2024-04-02 NOTE — LACTATION INITIAL EVALUATION - LACTATION INTERVENTIONS
assisted to put baby to left breast in football hold;  used manual pump to facilitate elongation of nipple;  baby was able to latch and suck but did not maintain latch;  gave xs nipple shield and baby latched and sucked with little colostrum in chamber of spoon;  mother was taught to manually express colostrum on spoon and feed baby from spoon/initiate/review safe skin-to-skin/initiate/review hand expression/initiate/review techniques for position and latch/initiate/review nipple shield use/initiate/review breast massage/compression/reviewed components of an effective feeding and at least 8 effective feedings per day required/reviewed importance of monitoring infant diapers, the breastfeeding log, and minimum output each day/reviewed risks of unnecessary formula supplementation/reviewed risks of artificial nipples/reviewed benefits and recommendations for rooming in/reviewed feeding on demand/by cue at least 8 times a day/reviewed indications of inadequate milk transfer that would require supplementation

## 2024-04-02 NOTE — OB PROVIDER DELIVERY SUMMARY - NSSELHIDDEN_OBGYN_ALL_OB_FT
[[email or web address]_OBGYN_ALL_OB_FT:[id number]],[NS_DeliveryAssist1_OBGYN_ALL_OB_FT:[id number]]

## 2024-04-03 VITALS
HEART RATE: 92 BPM | DIASTOLIC BLOOD PRESSURE: 76 MMHG | TEMPERATURE: 98 F | RESPIRATION RATE: 18 BRPM | SYSTOLIC BLOOD PRESSURE: 123 MMHG | OXYGEN SATURATION: 100 %

## 2024-04-03 DIAGNOSIS — Z3A.38 38 WEEKS GESTATION OF PREGNANCY: ICD-10-CM

## 2024-04-03 DIAGNOSIS — O47.1 FALSE LABOR AT OR AFTER 37 COMPLETED WEEKS OF GESTATION: ICD-10-CM

## 2024-04-03 PROCEDURE — 93010 ELECTROCARDIOGRAM REPORT: CPT

## 2024-04-03 RX ORDER — ACETAMINOPHEN 500 MG
3 TABLET ORAL
Qty: 0 | Refills: 0 | DISCHARGE
Start: 2024-04-03

## 2024-04-03 RX ORDER — FAMOTIDINE 10 MG/ML
20 INJECTION INTRAVENOUS DAILY
Refills: 0 | Status: DISCONTINUED | OUTPATIENT
Start: 2024-04-03 | End: 2024-04-03

## 2024-04-03 RX ORDER — IBUPROFEN 200 MG
1 TABLET ORAL
Qty: 0 | Refills: 0 | DISCHARGE
Start: 2024-04-03

## 2024-04-03 RX ORDER — DIBUCAINE 1 %
1 OINTMENT (GRAM) RECTAL
Qty: 0 | Refills: 0 | DISCHARGE
Start: 2024-04-03

## 2024-04-03 RX ORDER — AER TRAVELER 0.5 G/1
1 SOLUTION RECTAL; TOPICAL
Qty: 0 | Refills: 0 | DISCHARGE
Start: 2024-04-03

## 2024-04-03 RX ADMIN — SODIUM CHLORIDE 3 MILLILITER(S): 9 INJECTION INTRAMUSCULAR; INTRAVENOUS; SUBCUTANEOUS at 15:16

## 2024-04-03 RX ADMIN — SODIUM CHLORIDE 3 MILLILITER(S): 9 INJECTION INTRAMUSCULAR; INTRAVENOUS; SUBCUTANEOUS at 05:50

## 2024-04-03 RX ADMIN — FAMOTIDINE 20 MILLIGRAM(S): 10 INJECTION INTRAVENOUS at 12:54

## 2024-04-03 RX ADMIN — Medication 600 MILLIGRAM(S): at 00:19

## 2024-04-03 RX ADMIN — SIMETHICONE 80 MILLIGRAM(S): 80 TABLET, CHEWABLE ORAL at 12:54

## 2024-04-03 RX ADMIN — Medication 975 MILLIGRAM(S): at 09:39

## 2024-04-03 RX ADMIN — Medication 600 MILLIGRAM(S): at 12:17

## 2024-04-03 RX ADMIN — Medication 600 MILLIGRAM(S): at 06:18

## 2024-04-03 RX ADMIN — Medication 600 MILLIGRAM(S): at 11:44

## 2024-04-03 RX ADMIN — Medication 600 MILLIGRAM(S): at 05:48

## 2024-04-03 RX ADMIN — Medication 975 MILLIGRAM(S): at 10:30

## 2024-04-03 NOTE — CHART NOTE - NSCHARTNOTEFT_GEN_A_CORE
Upon chart review, it was noted that patient had several elevated BPs during this admission    BP x 24 hours: BP:  (102/63 - 156/93)      T(C): 36.6 (04-02-24 @ 05:00), Max: 37.1 (04-01-24 @ 23:56)  T(F): 97.8 (04-02-24 @ 05:00), Max: 98.78 (04-01-24 @ 23:56)  HR: 96 (04-02-24 @ 05:00) (86 - 158)  BP: 107/54 (04-02-24 @ 05:00) (102/63 - 156/93)  RR: 17 (04-02-24 @ 05:00) (14 - 18)  SpO2: 100% (04-02-24 @ 05:00) (73% - 100%)  Wt(kg): --    -----------------------------------------------------------------------------------------    Patient met diagnosis by: 2 or more elevated BPs as listed below:     BLOOD PRESSURES that were reviewed:     4/1/24 @ 1851pm--143/66    4/1/24 @ 1906pm--156/93 4/1/24 @ 2041pm--141/63    4/1/24 @ 2327pm--147/67    -----------------------------------------------------------------------------    GHTN Diagnosis       Diagnosis to be reviewed with patient, on postpartum rounds    24hr BP range as below  BP x 24 hours: BP:  (102/63 - 156/93)    ------------------------------------------------------------------      PLAN    1. HELLP labs ordered to r/o PEC    2. continue to monitor         -PEC s/s reviewed  -PEC precautions reviewed  -PEC educational materials provided    Blood pressure Rx sent to home pharmacy thru [facility]  Patient instructed to take BP TID and parameters reviewed  Patient to keep BP log and bring to appt at [facility] office  Patient to follow up @ [facility] office in 5-7 days for PP BP check    Discussed with Primary PP RN     Discussed with PP Resident MD Love    Discussed with MD Mynor Lam
RN called to say, that when she went to discharge patient and give discharge paperwork, patient reported SOB when ambulating         Patient seen at the bedside for reported SOB when standing and SOB on ambulation.     Patient states that she feels like she has to make extra effort to breathe during walking and standing up. She feels as if the air is not getting to her, reports shortness of breath.    On arrival to room, patient was in bathroom.     Patient ambulated on her own out of the bathroom and into the room without assistance.     While standing patient states she feels SOB again and it worsens as she continues ambulating and when she spends time standing    T(C): 36.6 (24 @ 09:59), Max: 37 (24 @ 05:57)  T(F): 97.8 (24 @ 09:59), Max: 98.6 (24 @ 05:57)  HR: 100 (24 @ 12:24) (83 - 103)  BP: 122/70 (24 @ 12:24) (106/75 - 122/70)  RR: 18 (24 @ 12:24) (18 - 18)  SpO2: 98% (24 @ 12:24) (98% - 98%)  Wt(kg): --    BP x 24 hours: BP:  (106/75 - 122/70)    ---------------------------------------------------------------------------------------------------------    LABS:  cret                        11.6   13.59 )-----------( 170      ( 2024 09:50 )             34.7     04    136  |  103  |  6<L>  ----------------------------<  80  3.8   |  23  |  0.51    Ca    9.0      2024 09:50    TPro  5.8<L>  /  Alb  3.2<L>  /  TBili  0.3  /  DBili  x   /  AST  28  /  ALT  18  /  AlkPhos  110      PT/INR - ( 2024 09:50 )   PT: 10.4 sec;   INR: 0.92 ratio         PTT - ( 2024 09:50 )  PTT:26.1 sec    ---------------------------------------------------------------------------  Assessment and Plan  PPD #2 s/p , QBL 237ml. GHTN. Doing well postpartum.     TN#  -BP x 24 hours: BP:  (106/75 - 120/57)  -HELLP WNL   -now reporting SOB on standing and while ambulating  -continue to monitor    Noted to have tachycardia on 24 and on 24, that has since resolved.   -HR: 100 (24 @ 12:24) (83 - 103)    -----------------------------------------------------------------    PLAN    1. Simethicone to be given now    2. Pepcid to be given now--ordered    3. EKG ordered       Discharge planning      Discussed with MD Anai Lam

## 2024-04-03 NOTE — PROGRESS NOTE ADULT - SUBJECTIVE AND OBJECTIVE BOX
Post-partum Note,   She is a  23y woman who is now post-partum day: 1    Subjective:  The patient feels well.  She is ambulating.   She is tolerating regular diet.  She denies nausea and vomiting; denies fever.  She is voiding.  Her pain is controlled.  She reports normal postpartum bleeding.  She is breastfeeding.  She is formula feeding.  She is bonding with infant.    Physical exam:    Vital Signs Last 24 Hrs  T(C): 36.6 (2024 05:00), Max: 37.1 (2024 23:56)  T(F): 97.8 (2024 05:00), Max: 98.78 (2024 23:56)  HR: 96 (2024 05:00) (86 - 158)  BP: 107/54 (2024 05:00) (102/63 - 156/93)  BP(mean): --  RR: 17 (2024 05:00) (14 - 18)  SpO2: 100% (2024 05:00) (73% - 100%)    Parameters below as of 2024 18:33  Patient On (Oxygen Delivery Method): room air        Gen: NAD  Breast: Soft, nontender, not engorged.  Abdomen: Soft, nontender, no distension , firm uterine fundus at umbilicus.  Pelvic: Normal lochia noted  Ext: No calf tenderness    LABS:                        11.6   13.59 )-----------( 170      ( 2024 09:50 )             34.7     ABO Interpretation: AB ( @ 18:45)  Rh Interpretation: Positive ( @ 18:45)  Antibody Screen: Negative ( @ 18:45)  ABO Interpretation: AB ( @ 18:45)  Rh Interpretation: Positive ( @ 18:45)    Rubella status:     Allergies    penicillin (Other)    Intolerances      MEDICATIONS  (STANDING):  acetaminophen     Tablet .. 975 milliGRAM(s) Oral <User Schedule>  diphtheria/tetanus/pertussis (acellular) Vaccine (Adacel) 0.5 milliLiter(s) IntraMuscular once  ibuprofen  Tablet. 600 milliGRAM(s) Oral every 6 hours  ketorolac   Injectable 30 milliGRAM(s) IV Push once  prenatal multivitamin 1 Tablet(s) Oral daily  sodium chloride 0.9% lock flush 3 milliLiter(s) IV Push every 8 hours    MEDICATIONS  (PRN):  benzocaine 20%/menthol 0.5% Spray 1 Spray(s) Topical every 6 hours PRN for Perineal discomfort  dibucaine 1% Ointment 1 Application(s) Topical every 6 hours PRN Perineal discomfort  diphenhydrAMINE 25 milliGRAM(s) Oral every 6 hours PRN Pruritus  hydrocortisone 1% Cream 1 Application(s) Topical every 6 hours PRN Moderate Pain (4-6)  lanolin Ointment 1 Application(s) Topical every 6 hours PRN nipple soreness  magnesium hydroxide Suspension 30 milliLiter(s) Oral two times a day PRN Constipation  oxyCODONE    IR 5 milliGRAM(s) Oral every 3 hours PRN Moderate to Severe Pain (4-10)  oxyCODONE    IR 5 milliGRAM(s) Oral once PRN Moderate to Severe Pain (4-10)  pramoxine 1%/zinc 5% Cream 1 Application(s) Topical every 4 hours PRN Moderate Pain (4-6)  simethicone 80 milliGRAM(s) Chew every 4 hours PRN Gas  witch hazel Pads 1 Application(s) Topical every 4 hours PRN Perineal discomfort          
Post-partum Note,   She is a  23y woman who is now post-partum day: 2    Subjective:  The patient feels well.  She is ambulating.   She is tolerating regular diet.  She denies nausea and vomiting; denies fever.  She is voiding.  Her pain is controlled.  She reports normal postpartum bleeding.  She is breastfeeding.  She is formula feeding.  She is bonding with infant.    Physical exam:    Vital Signs Last 24 Hrs  T(C): 36.6 (2024 09:59), Max: 37 (2024 05:57)  T(F): 97.8 (2024 09:59), Max: 98.6 (2024 05:57)  HR: 93 (2024 09:59) (83 - 103)  BP: 106/75 (2024 09:59) (106/75 - 120/57)  BP(mean): --  RR: 18 (2024 09:59) (18 - 18)  SpO2: 98% (2024 09:59) (98% - 98%)    Parameters below as of 2024 01:37  Patient On (Oxygen Delivery Method): room air        Gen: NAD  Breast: Soft, nontender, not engorged.  Abdomen: Soft, nontender, no distension , firm uterine fundus at umbilicus.  Pelvic: Normal lochia noted  Ext: No calf tenderness    LABS:                        11.6   13.59 )-----------( 170      ( 2024 09:50 )             34.7       Rubella status:     Allergies    penicillin (Other)    Intolerances      MEDICATIONS  (STANDING):  acetaminophen     Tablet .. 975 milliGRAM(s) Oral <User Schedule>  diphtheria/tetanus/pertussis (acellular) Vaccine (Adacel) 0.5 milliLiter(s) IntraMuscular once  ibuprofen  Tablet. 600 milliGRAM(s) Oral every 6 hours  prenatal multivitamin 1 Tablet(s) Oral daily  sodium chloride 0.9% lock flush 3 milliLiter(s) IV Push every 8 hours    MEDICATIONS  (PRN):  benzocaine 20%/menthol 0.5% Spray 1 Spray(s) Topical every 6 hours PRN for Perineal discomfort  dibucaine 1% Ointment 1 Application(s) Topical every 6 hours PRN Perineal discomfort  diphenhydrAMINE 25 milliGRAM(s) Oral every 6 hours PRN Pruritus  hydrocortisone 1% Cream 1 Application(s) Topical every 6 hours PRN Moderate Pain (4-6)  lanolin Ointment 1 Application(s) Topical every 6 hours PRN nipple soreness  magnesium hydroxide Suspension 30 milliLiter(s) Oral two times a day PRN Constipation  oxyCODONE    IR 5 milliGRAM(s) Oral every 3 hours PRN Moderate to Severe Pain (4-10)  oxyCODONE    IR 5 milliGRAM(s) Oral once PRN Moderate to Severe Pain (4-10)  pramoxine 1%/zinc 5% Cream 1 Application(s) Topical every 4 hours PRN Moderate Pain (4-6)  simethicone 80 milliGRAM(s) Chew every 4 hours PRN Gas  witch hazel Pads 1 Application(s) Topical every 4 hours PRN Perineal discomfort

## 2024-04-03 NOTE — PROVIDER CONTACT NOTE (OTHER) - ACTION/TREATMENT ORDERED:
I reviewed the H&P, I examined the patient, and there are no changes in the patient's condition.    
PT cleared for transfer to PP
Key will come to assess patient.
MD Mcrae states to encourage PO hydration at this time since pt is asymptomatic and that the heartrate should be reassessed at 5:00am.

## 2024-04-03 NOTE — PROVIDER CONTACT NOTE (OTHER) - ASSESSMENT
Orthostatic vitals:  lyin/55 . Sittin/65 . Standin/52 .  Fundus is midline and firm. Lochia is moderate and WDL. Pt denies dizziness, SOB, chest pain, and palpitations.
Vitals stable, /70  o2 sat 98%. Lungs CTA, Heart rate and rhythem regular upon auscultation. Bleeding light, voiding without difficulty. Denies SOB at rest, only when out of bed to ambulate. Denies any headache, chest pain, shortness of breath.
pt denies lightheadedness, dizzines, SOB

## 2024-04-03 NOTE — PROVIDER CONTACT NOTE (OTHER) - SITUATION
PP orthos as follows  /65   sitting /58   standing /63   HR resolved to 106
Pt admitted to [facility] from L&D. Orthostatic vitals taken. Pt tachycardic.
upon entering room to discharge patient, patient c/o shortness of breath when out of bed to ambulate

## 2024-04-03 NOTE — PROGRESS NOTE ADULT - ASSESSMENT
Assessment and Plan  PPD #2 s/p , QBL 237ml. GHTN.     GHTN#  -BP x 24 hours: BP:  (106/75 - 120/57)  -HELLP WNL   -denies s/s of PEC  -continue to monitor    Doing well postpartum  Increase ambulation.  Encourage breastfeeding.  Continue taking PO pain meds PRN    PP & PPD Instructions reviewed.  PP educational materials reviewed & provided  PEC s/s reviewed with patient     Follow up @ [facility] office in 7days for PP BP Check, BP cuff Rx went to home pharmacy  Monitor BP @ home 3 times daily (morning/noon/night)  keep a strict blood pressure log and bring to the office 5-7 days after hospital discharge for review  if you have BP > 160/100 call the office for further advise  if you have headaches, vision changes, upper abdominal pain call the office to notify and go to [facility] Triage for evaluation    Discharge planning      Discussed with MD Anai Lam

## 2024-04-03 NOTE — PROVIDER CONTACT NOTE (OTHER) - BACKGROUND
NSD 4/1 at 2336, . gest HTN, no meds. breastfeeding. no significant medial/surgical history.
s/p  @ 7170,  2ns degree lac, bilateral labial tears
Pt had an NSD on 4/1/24 @ 23:36. . Pt had a 2nd degree laceration and bilateral labial tear.

## 2025-03-17 NOTE — OB PROVIDER H&P - HISTORY OF PRESENT ILLNESS
7.3L  cloudy serous fluid from paracentesis. Specimens sent to lab Ms. EVANGELIST OBREGON is a 23y  @ 38w6d p/w ctx q5min incr in pain. Seen last night for labor, discharged @ 2-3 / 50 / -3. + FM. + Brown spotting. Denies lof. GBS neg (24).   ** Provider called into [facility], RN noted patient w decel on NST in 90s, HR sounding fetal, maternal HR 110s on pulse ox, immediately brought to DT1 with baseline 125, mod variability, + accels.   PNC: [facility]. Uncx. Pt denies complications with blood pressure and/or blood sugar.